# Patient Record
Sex: MALE | Employment: OTHER | URBAN - METROPOLITAN AREA
[De-identification: names, ages, dates, MRNs, and addresses within clinical notes are randomized per-mention and may not be internally consistent; named-entity substitution may affect disease eponyms.]

---

## 2017-06-07 ENCOUNTER — APPOINTMENT (OUTPATIENT)
Dept: RADIOLOGY | Facility: MEDICAL CENTER | Age: 72
DRG: 066 | End: 2017-06-07
Attending: EMERGENCY MEDICINE
Payer: OTHER MISCELLANEOUS

## 2017-06-07 ENCOUNTER — HOSPITAL ENCOUNTER (INPATIENT)
Facility: MEDICAL CENTER | Age: 72
LOS: 1 days | DRG: 066 | End: 2017-06-08
Attending: EMERGENCY MEDICINE | Admitting: INTERNAL MEDICINE
Payer: OTHER MISCELLANEOUS

## 2017-06-07 ENCOUNTER — OFFICE VISIT (OUTPATIENT)
Dept: URGENT CARE | Facility: PHYSICIAN GROUP | Age: 72
End: 2017-06-07
Payer: OTHER MISCELLANEOUS

## 2017-06-07 ENCOUNTER — RESOLUTE PROFESSIONAL BILLING HOSPITAL PROF FEE (OUTPATIENT)
Dept: HOSPITALIST | Facility: MEDICAL CENTER | Age: 72
End: 2017-06-07
Payer: OTHER MISCELLANEOUS

## 2017-06-07 VITALS
SYSTOLIC BLOOD PRESSURE: 184 MMHG | OXYGEN SATURATION: 96 % | RESPIRATION RATE: 16 BRPM | BODY MASS INDEX: 42.49 KG/M2 | DIASTOLIC BLOOD PRESSURE: 84 MMHG | HEIGHT: 65 IN | HEART RATE: 66 BPM | WEIGHT: 255 LBS | TEMPERATURE: 98.2 F

## 2017-06-07 DIAGNOSIS — R20.2 PARESTHESIA: ICD-10-CM

## 2017-06-07 DIAGNOSIS — R47.81 SLURRED SPEECH: ICD-10-CM

## 2017-06-07 DIAGNOSIS — I65.22 STENOSIS OF LEFT CAROTID ARTERY: ICD-10-CM

## 2017-06-07 DIAGNOSIS — I63.9 CEREBROVASCULAR ACCIDENT (CVA), UNSPECIFIED MECHANISM (HCC): ICD-10-CM

## 2017-06-07 DIAGNOSIS — M79.601 RIGHT ARM PAIN: ICD-10-CM

## 2017-06-07 DIAGNOSIS — R27.0 ATAXIA: ICD-10-CM

## 2017-06-07 LAB
ALBUMIN SERPL BCP-MCNC: 4.2 G/DL (ref 3.2–4.9)
ALBUMIN/GLOB SERPL: 1.4 G/DL
ALP SERPL-CCNC: 68 U/L (ref 30–99)
ALT SERPL-CCNC: 17 U/L (ref 2–50)
ANION GAP SERPL CALC-SCNC: 8 MMOL/L (ref 0–11.9)
APTT PPP: 27.5 SEC (ref 24.7–36)
AST SERPL-CCNC: 17 U/L (ref 12–45)
BASOPHILS # BLD AUTO: 0.4 % (ref 0–1.8)
BASOPHILS # BLD: 0.05 K/UL (ref 0–0.12)
BILIRUB SERPL-MCNC: 0.4 MG/DL (ref 0.1–1.5)
BUN SERPL-MCNC: 21 MG/DL (ref 8–22)
CALCIUM SERPL-MCNC: 9.3 MG/DL (ref 8.5–10.5)
CHLORIDE SERPL-SCNC: 102 MMOL/L (ref 96–112)
CO2 SERPL-SCNC: 27 MMOL/L (ref 20–33)
CREAT SERPL-MCNC: 0.87 MG/DL (ref 0.5–1.4)
EOSINOPHIL # BLD AUTO: 0.28 K/UL (ref 0–0.51)
EOSINOPHIL NFR BLD: 2.5 % (ref 0–6.9)
ERYTHROCYTE [DISTWIDTH] IN BLOOD BY AUTOMATED COUNT: 49.9 FL (ref 35.9–50)
GFR SERPL CREATININE-BSD FRML MDRD: >60 ML/MIN/1.73 M 2
GLOBULIN SER CALC-MCNC: 3.1 G/DL (ref 1.9–3.5)
GLUCOSE SERPL-MCNC: 118 MG/DL (ref 65–99)
HCT VFR BLD AUTO: 45.8 % (ref 42–52)
HGB BLD-MCNC: 14.7 G/DL (ref 14–18)
IMM GRANULOCYTES # BLD AUTO: 0.04 K/UL (ref 0–0.11)
IMM GRANULOCYTES NFR BLD AUTO: 0.4 % (ref 0–0.9)
INR PPP: 0.95 (ref 0.87–1.13)
LYMPHOCYTES # BLD AUTO: 4.09 K/UL (ref 1–4.8)
LYMPHOCYTES NFR BLD: 36.4 % (ref 22–41)
MAGNESIUM SERPL-MCNC: 2.1 MG/DL (ref 1.5–2.5)
MCH RBC QN AUTO: 29.3 PG (ref 27–33)
MCHC RBC AUTO-ENTMCNC: 32.1 G/DL (ref 33.7–35.3)
MCV RBC AUTO: 91.4 FL (ref 81.4–97.8)
MONOCYTES # BLD AUTO: 1 K/UL (ref 0–0.85)
MONOCYTES NFR BLD AUTO: 8.9 % (ref 0–13.4)
NEUTROPHILS # BLD AUTO: 5.79 K/UL (ref 1.82–7.42)
NEUTROPHILS NFR BLD: 51.4 % (ref 44–72)
NRBC # BLD AUTO: 0 K/UL
NRBC BLD AUTO-RTO: 0 /100 WBC
PLATELET # BLD AUTO: 235 K/UL (ref 164–446)
PMV BLD AUTO: 9.8 FL (ref 9–12.9)
POTASSIUM SERPL-SCNC: 4.2 MMOL/L (ref 3.6–5.5)
PROT SERPL-MCNC: 7.3 G/DL (ref 6–8.2)
PROTHROMBIN TIME: 13 SEC (ref 12–14.6)
RBC # BLD AUTO: 5.01 M/UL (ref 4.7–6.1)
SODIUM SERPL-SCNC: 137 MMOL/L (ref 135–145)
TROPONIN I SERPL-MCNC: <0.01 NG/ML (ref 0–0.04)
TSH SERPL DL<=0.005 MIU/L-ACNC: 4.43 UIU/ML (ref 0.3–3.7)
WBC # BLD AUTO: 11.3 K/UL (ref 4.8–10.8)

## 2017-06-07 PROCEDURE — 93005 ELECTROCARDIOGRAM TRACING: CPT | Performed by: EMERGENCY MEDICINE

## 2017-06-07 PROCEDURE — 70496 CT ANGIOGRAPHY HEAD: CPT

## 2017-06-07 PROCEDURE — 84484 ASSAY OF TROPONIN QUANT: CPT

## 2017-06-07 PROCEDURE — A9270 NON-COVERED ITEM OR SERVICE: HCPCS | Performed by: EMERGENCY MEDICINE

## 2017-06-07 PROCEDURE — 99285 EMERGENCY DEPT VISIT HI MDM: CPT

## 2017-06-07 PROCEDURE — 99203 OFFICE O/P NEW LOW 30 MIN: CPT | Performed by: PHYSICIAN ASSISTANT

## 2017-06-07 PROCEDURE — 85730 THROMBOPLASTIN TIME PARTIAL: CPT

## 2017-06-07 PROCEDURE — 85025 COMPLETE CBC W/AUTO DIFF WBC: CPT

## 2017-06-07 PROCEDURE — 700117 HCHG RX CONTRAST REV CODE 255: Performed by: EMERGENCY MEDICINE

## 2017-06-07 PROCEDURE — 770006 HCHG ROOM/CARE - MED/SURG/GYN SEMI*

## 2017-06-07 PROCEDURE — 84443 ASSAY THYROID STIM HORMONE: CPT

## 2017-06-07 PROCEDURE — 99222 1ST HOSP IP/OBS MODERATE 55: CPT | Performed by: INTERNAL MEDICINE

## 2017-06-07 PROCEDURE — 85610 PROTHROMBIN TIME: CPT

## 2017-06-07 PROCEDURE — 80053 COMPREHEN METABOLIC PANEL: CPT

## 2017-06-07 PROCEDURE — 36415 COLL VENOUS BLD VENIPUNCTURE: CPT

## 2017-06-07 PROCEDURE — 70498 CT ANGIOGRAPHY NECK: CPT

## 2017-06-07 PROCEDURE — 700105 HCHG RX REV CODE 258: Performed by: EMERGENCY MEDICINE

## 2017-06-07 PROCEDURE — 700102 HCHG RX REV CODE 250 W/ 637 OVERRIDE(OP): Performed by: EMERGENCY MEDICINE

## 2017-06-07 PROCEDURE — 83735 ASSAY OF MAGNESIUM: CPT

## 2017-06-07 PROCEDURE — 83036 HEMOGLOBIN GLYCOSYLATED A1C: CPT

## 2017-06-07 RX ORDER — ONDANSETRON 4 MG/1
4 TABLET, ORALLY DISINTEGRATING ORAL EVERY 4 HOURS PRN
Status: DISCONTINUED | OUTPATIENT
Start: 2017-06-07 | End: 2017-06-08 | Stop reason: HOSPADM

## 2017-06-07 RX ORDER — INSULIN GLARGINE 100 [IU]/ML
24 INJECTION, SOLUTION SUBCUTANEOUS 2 TIMES DAILY
COMMUNITY

## 2017-06-07 RX ORDER — AMOXICILLIN 250 MG
2 CAPSULE ORAL 2 TIMES DAILY
Status: DISCONTINUED | OUTPATIENT
Start: 2017-06-07 | End: 2017-06-08 | Stop reason: HOSPADM

## 2017-06-07 RX ORDER — SODIUM CHLORIDE 9 MG/ML
INJECTION, SOLUTION INTRAVENOUS CONTINUOUS
Status: DISCONTINUED | OUTPATIENT
Start: 2017-06-07 | End: 2017-06-08 | Stop reason: HOSPADM

## 2017-06-07 RX ORDER — METOPROLOL SUCCINATE 50 MG/1
50 TABLET, EXTENDED RELEASE ORAL DAILY
COMMUNITY
End: 2017-06-07

## 2017-06-07 RX ORDER — SODIUM CHLORIDE 9 MG/ML
1000 INJECTION, SOLUTION INTRAVENOUS ONCE
Status: COMPLETED | OUTPATIENT
Start: 2017-06-07 | End: 2017-06-07

## 2017-06-07 RX ORDER — ASPIRIN 300 MG/1
600 SUPPOSITORY RECTAL ONCE
Status: COMPLETED | OUTPATIENT
Start: 2017-06-07 | End: 2017-06-07

## 2017-06-07 RX ORDER — ASPIRIN 81 MG/1
81 TABLET, CHEWABLE ORAL DAILY
Status: DISCONTINUED | OUTPATIENT
Start: 2017-06-08 | End: 2017-06-08 | Stop reason: HOSPADM

## 2017-06-07 RX ORDER — BISACODYL 10 MG
10 SUPPOSITORY, RECTAL RECTAL
Status: DISCONTINUED | OUTPATIENT
Start: 2017-06-07 | End: 2017-06-08 | Stop reason: HOSPADM

## 2017-06-07 RX ORDER — POLYETHYLENE GLYCOL 3350 17 G/17G
1 POWDER, FOR SOLUTION ORAL
Status: DISCONTINUED | OUTPATIENT
Start: 2017-06-07 | End: 2017-06-08 | Stop reason: HOSPADM

## 2017-06-07 RX ORDER — CLONAZEPAM 1 MG/1
0.5 TABLET ORAL 2 TIMES DAILY
COMMUNITY

## 2017-06-07 RX ORDER — ONDANSETRON 2 MG/ML
4 INJECTION INTRAMUSCULAR; INTRAVENOUS EVERY 4 HOURS PRN
Status: DISCONTINUED | OUTPATIENT
Start: 2017-06-07 | End: 2017-06-08 | Stop reason: HOSPADM

## 2017-06-07 RX ORDER — THYROID 90 MG/1
90 TABLET ORAL DAILY
COMMUNITY

## 2017-06-07 RX ORDER — ATORVASTATIN CALCIUM 80 MG/1
80 TABLET, FILM COATED ORAL EVERY EVENING
Status: DISCONTINUED | OUTPATIENT
Start: 2017-06-07 | End: 2017-06-08 | Stop reason: HOSPADM

## 2017-06-07 RX ORDER — DEXTROSE MONOHYDRATE 25 G/50ML
25 INJECTION, SOLUTION INTRAVENOUS
Status: DISCONTINUED | OUTPATIENT
Start: 2017-06-07 | End: 2017-06-08 | Stop reason: HOSPADM

## 2017-06-07 RX ADMIN — SODIUM CHLORIDE 1000 ML: 9 INJECTION, SOLUTION INTRAVENOUS at 19:48

## 2017-06-07 RX ADMIN — IOHEXOL 100 ML: 350 INJECTION, SOLUTION INTRAVENOUS at 20:45

## 2017-06-07 RX ADMIN — ASPIRIN 600 MG: 300 SUPPOSITORY RECTAL at 22:07

## 2017-06-07 ASSESSMENT — ENCOUNTER SYMPTOMS
TREMORS: 0
SENSORY CHANGE: 1
DIZZINESS: 1
NAUSEA: 0
CHILLS: 0
VOMITING: 0
LOSS OF CONSCIOUSNESS: 0
FEVER: 0
PALPITATIONS: 0
SPEECH CHANGE: 1
FOCAL WEAKNESS: 1
SEIZURES: 0
TINGLING: 1

## 2017-06-07 NOTE — IP AVS SNAPSHOT
6/8/2017    Den Mayes  [address] [facility] [address]  [place]    Dear Den:    [facility] wants to ensure your discharge home is safe and you or your loved ones have had all of your questions answered regarding your care after you leave the hospital.    Below is a list of resources and contact information should you have any questions regarding your hospital stay, follow-up instructions, or active medical symptoms.    Questions or Concerns Regarding… Contact   Medical Questions Related to Your Discharge  (7 days a week, 8am-5pm) Contact a Nurse Care Coordinator   [phone]   Medical Questions Not Related to Your Discharge  (24 hours a day / 7 days a week)  Contact the Nurse Health Line   [phone]    Medications or Discharge Instructions Refer to your discharge packet   or contact your [facility] Primary Care Provider   [phone]   Follow-up Appointment(s) Schedule your appointment via [facility]   or contact Scheduling [phone]   Billing Review your statement via Atosho  or contact Billing [phone]   Medical Records Review your records via Atosho   or contact Medical Records [phone]     You may receive a telephone call within two days of discharge. This call is to make certain you understand your discharge instructions and have the opportunity to have any questions answered. You can also easily access your medical information, test results and upcoming appointments via the Atosho free online health management tool. You can learn more and sign up at [employer]/Atosho. For assistance setting up your Atosho account, please call [phone].    Once again, we want to ensure your discharge home is safe and that you have a clear understanding of any next steps in your care. If you have any questions or concerns, please do not hesitate to contact us, we are here for you. Thank you for choosing [facility] for your healthcare needs.    Sincerely,    Your [facility] Healthcare Team

## 2017-06-07 NOTE — MR AVS SNAPSHOT
"        Den Hernandezblay   2017 5:10 PM   Office Visit   MRN: [id number]    Department:  [facility]   Dept Phone:  [phone]    Description:  Male : 1945   Provider:  Michael Rivers PA-C           Reason for Visit     Arm Pain           Allergies as of 2017     No Known Allergies      You were diagnosed with     Slurred speech   [2015]       Ataxia   [460131]       Right arm pain   [271271]       Paresthesia   [2015]         Vital Signs     Blood Pressure Pulse Temperature Respirations Height Weight    184/84 mmHg 66 36.8 °C (98.2 °F) 16 1.651 m (5' 5\") 115.667 kg (255 lb)    Body Mass Index Oxygen Saturation                42.43 kg/m2 96%          Basic Information     Date Of Birth Sex Race Ethnicity Preferred Language    1945 Male Unable to Obtain Unknown English      Health Maintenance     Patient has no pending health maintenance at this time      Current Immunizations     No immunizations on file.      Below and/or attached are the medications your provider expects you to take. Review all of your home medications and newly ordered medications with your provider and/or pharmacist. Follow medication instructions as directed by your provider and/or pharmacist. Please keep your medication list with you and share with your provider. Update the information when medications are discontinued, doses are changed, or new medications (including over-the-counter products) are added; and carry medication information at all times in the event of emergency situations     Allergies:  No Known Allergies          Medications  Valid as of: 2017 -  6:16 PM    Generic Name Brand Name Tablet Size Instructions for use    ClonazePAM (Tab) KLONOPIN 1 MG Take 0.5 mg by mouth 2 times a day.        Insulin Glargine (Solution) LANTUS 100 UNIT/ML Inject  as instructed every evening.        Metoprolol Succinate (TABLET SR 24 HR) TOPROL XL 50 MG Take 50 mg by mouth every day.        Thyroid (Tab) " ARMOUR THYROID 90 MG Take 90 mg by mouth every day.        .                 Medicines prescribed today were sent to:     None      Medication refill instructions:       If your prescription bottle indicates you have medication refills left, it is not necessary to call your provider’s office. Please contact your pharmacy and they will refill your medication.    If your prescription bottle indicates you do not have any refills left, you may request refills at any time through one of the following ways: The online [employer] system (except Urgent Care), by calling your provider’s office, or by asking your pharmacy to contact your provider’s office with a refill request. Medication refills are processed only during regular business hours and may not be available until the next business day. Your provider may request additional information or to have a follow-up visit with you prior to refilling your medication.   *Please Note: Medication refills are assigned a new Rx number when refilled electronically. Your pharmacy may indicate that no refills were authorized even though a new prescription for the same medication is available at the pharmacy. Please request the medicine by name with the pharmacy before contacting your provider for a refill.           [employer] Access Code: [email or web address]  Expires: 7/7/2017  6:16 PM    Your email address is not on file at [email or web address].  Email Addresses are required for you to sign up for [employer], please contact [phone] to verify your personal information and to provide your email address prior to attempting to register for [employer].    Den Mayes  [address]  [place]    [employer]  A secure, online tool to manage your health information     [facility]’s [employer]® is a secure, online tool that connects you to your personalized health information from the privacy of your home -- day or night - making it very easy for you to manage your healthcare. Once the  activation process is completed, you can even access your medical information using the UGE ivory, which is available for free in the Apple Ivory store or Google Play store.     To learn more about UGE, visit [email or web address]    There are two levels of access available (as shown below):   My Chart Features  [facility] Doctor [facility]  Urgent  Care Non-[facility] Primary Care Doctor   Email your healthcare team securely and privately 24/7 X X X    Manage appointments: schedule your next appointment; view details of past/upcoming appointments X      Request prescription refills. X      View recent personal medical records, including lab and immunizations X X X X   View health record, including health history, allergies, medications X X X X   Read reports about your outpatient visits, procedures, consult and ER notes X X X X   See your discharge summary, which is a recap of your hospital and/or ER visit that includes your diagnosis, lab results, and care plan X X  X     How to register for UGE:  Once your e-mail address has been verified, follow the following steps to sign up for UGE.     1. Go to  [email or web address]  2. Click on the Sign Up Now box, which takes you to the New Member Sign Up page. You will need to provide the following information:  a. Enter your UGE Access Code exactly as it appears at the top of this page. (You will not need to use this code after you’ve completed the sign-up process. If you do not sign up before the expiration date, you must request a new code.)   b. Enter your date of birth.   c. Enter your home email address.   d. Click Submit, and follow the next screen’s instructions.  3. Create a BitXt ID. This will be your UGE login ID and cannot be changed, so think of one that is secure and easy to remember.  4. Create a UGE password. You can change your password at any time.  5. Enter your Password Reset Question and Answer.  This can be used at a later time if you forget your password.   6. Enter your e-mail address. This allows you to receive e-mail notifications when new information is available in [employer].  7. Click Sign Up. You can now view your health information.    For assistance activating your [employer] account, call [phone]

## 2017-06-07 NOTE — IP AVS SNAPSHOT
" <p align=\"LEFT\"><IMG SRC=\"//[email or web address]$/[email or web address]\" alt=\"Image\" WIDTH=\"50%\" HEIGHT=\"200\" BORDER=\"\"></p>                   Name:Den Mayes  Medical Record Number:[id number]  CSN: [id number]    YOB: 1945   Age: 72 y.o.  Sex: male  HT:1.702 m (5' 7.01\") WT: 121.3 kg (267 lb 6.7 oz)          Admit Date: 6/7/2017     Discharge Date:   Today's Date: 6/8/2017  Attending Doctor:  Arelis Miles M.D.                  Allergies:  Review of patient's allergies indicates no known allergies.             Medication List      Take these Medications        Instructions    ARMOUR THYROID 90 MG Tabs   Generic drug:  thyroid    Take 90 mg by mouth every day.   Dose:  90 mg       aspirin 81 MG Chew chewable tablet   Commonly known as:  ASA    Take 1 Tab by mouth every day.   Dose:  81 mg       atorvastatin 80 MG tablet   Commonly known as:  LIPITOR    Take 1 Tab by mouth every evening.   Dose:  80 mg       clonazepam 1 MG Tabs   Commonly known as:  KLONOPIN    Take 0.5 mg by mouth 2 times a day.   Dose:  0.5 mg       insulin glargine 100 UNIT/ML Soln   Commonly known as:  LANTUS    Inject 24 Units as instructed 2 times a day.   Dose:  24 Units         "

## 2017-06-07 NOTE — IP AVS SNAPSHOT
" Home Care Instructions                                                                                                                  Name:Den Mayes  Medical Record Number:[id number]  CSN: [id number]    YOB: 1945   Age: 72 y.o.  Sex: male  HT:1.702 m (5' 7.01\") WT: 121.3 kg (267 lb 6.7 oz)          Admit Date: 6/7/2017     Discharge Date:   Today's Date: 6/8/2017  Attending Doctor:  Arelis Miles M.D.                  Allergies:  Review of patient's allergies indicates no known allergies.            Discharge Instructions       Discharge Instructions    Discharged to home by car with relative. Discharged via wheelchair, hospital escort: Yes.  Special equipment needed: Not Applicable    Be sure to schedule a follow-up appointment with your primary care doctor or any specialists as instructed.     Discharge Plan:   Influenza Vaccine Indication: Indicated: Not available from distributor/    I understand that a diet low in cholesterol, fat, and sodium is recommended for good health. Unless I have been given specific instructions below for another diet, I accept this instruction as my diet prescription.   Other diet: Cardiac    Special Instructions: None    · Is patient discharged on Warfarin / Coumadin?   No     · Is patient Post Blood Transfusion?  No    Depression / Suicide Risk    As you are discharged from this [facility] facility, it is important to learn how to keep safe from harming yourself.    Recognize the warning signs:  · Abrupt changes in personality, positive or negative- including increase in energy   · Giving away possessions  · Change in eating patterns- significant weight changes-  positive or negative  · Change in sleeping patterns- unable to sleep or sleeping all the time   · Unwillingness or inability to communicate  · Depression  · Unusual sadness, discouragement and loneliness  · Talk of wanting to die  · Neglect of personal appearance   · Rebelliousness- reckless " behavior  · Withdrawal from people/activities they love  · Confusion- inability to concentrate     If you or a loved one observes any of these behaviors or has concerns about self-harm, here's what you can do:  · Talk about it- your feelings and reasons for harming yourself  · Remove any means that you might use to hurt yourself (examples: pills, rope, extension cords, firearm)  · Get professional help from the community (Mental Health, Substance Abuse, psychological counseling)  · Do not be alone:Call your Safe Contact- someone whom you trust who will be there for you.  · Call your local CRISIS HOTLINE 734-0387 or 671-235-7891  · Call your local Children's Mobile Crisis Response Team [place] (350) 150-2301 or www.MyLabYogi.com  · Call the toll free National Suicide Prevention Hotlines   · National Suicide Prevention Lifeline 309-599-PDVB (5921)  · [place] Line Network 800-SUICIDE (490-9717)    Stroke Prevention  Some health problems and behaviors may make it more likely for you to have a stroke. Below are ways to lessen your risk of having a stroke.   · Be active for at least 30 minutes on most or all days.  · Do not smoke. Try not to be around others who smoke.  · Do not drink too much alcohol.  ¨ Do not have more than 2 drinks a day if you are a man.  ¨ Do not have more than 1 drink a day if you are a woman and are not pregnant.  · Eat healthy foods, such as fruits and vegetables. If you were put on a specific diet, follow the diet as told.  · Keep your cholesterol levels under control through diet and medicines. Look for foods that are low in saturated fat, trans fat, cholesterol, and are high in fiber.  · If you have diabetes, follow all diet plans and take your medicine as told.  · Ask your doctor if you need treatment to lower your blood pressure. If you have high blood pressure (hypertension), follow all diet plans and take your medicine as told by your doctor.  · If you are 18-39 years old,  "have your blood pressure checked every 3-5 years. If you are age 40 or older, have your blood pressure checked every year.  · Keep a healthy weight. Eat foods that are low in calories, salt, saturated fat, trans fat, and cholesterol.  · Do not take drugs.  · Avoid birth control pills, if this applies. Talk to your doctor about the risks of taking birth control pills.  · Talk to your doctor if you have sleep problems (sleep apnea).  · Take all medicine as told by your doctor.  ¨ You may be told to take aspirin or blood thinner medicine. Take this medicine as told by your doctor.  ¨ Understand your medicine instructions.  · Make sure any other conditions you have are being taken care of.  GET HELP RIGHT AWAY IF:  · You suddenly lose feeling (you feel numb) or have weakness in your face, arm, or leg.  · Your face or eyelid hangs down to one side.  · You suddenly feel confused.  · You have trouble talking (aphasia) or understanding what people are saying.  · You suddenly have trouble seeing in one or both eyes.  · You suddenly have trouble walking.  · You are dizzy.  · You lose your balance or your movements are clumsy (uncoordinated).  · You suddenly have a very bad headache and you do not know the cause.  · You have new chest pain.  · Your heart feels like it is fluttering or skipping a beat (irregular heartbeat).  Do not wait to see if the symptoms above go away. Get help right away. Call your local emergency services (911 in [place] Do not drive yourself to the hospital.     This information is not intended to replace advice given to you by your health care provider. Make sure you discuss any questions you have with your health care provider.     Document Released: 06/18/2013 Document Revised: 01/08/2016 Document Reviewed: 06/20/2014  [employer] Interactive Patient Education ©2016 Elsevier Inc.  STROKE POCKET CARD    Stroke Recommendations    Action Details      Neuro checks  Per physician order set or \"Assessment " "Frequency Guidelines\".     DVT/VTE prophylaxis by 2nd day Stroke pts are at increased risk of developing a DVT. Consider both pharmacological (Lovenox & Heparin SQ) and mechanical (SCD's).   Anticoagulation therapy for Pts with A-fib/flutter Atrial fibrillation/flutter is the most common form of cardiac arrhythmia. Blood pools in the atria of the heart, which can result in the formation of clots. Anticoagulation therapy can prevent initial stroke and prevent recurrent ischemic stroke. Pt's with a-fib/flutter should be discharged on anticoagulation unless contraindicated.    Antithrombotic medication by end of 2nd day ASA, Plavix, Aggrenox, Coumadin. Antithrombotic medication should be given by the end of the second day unless contraindicated. If NPO, consider alternate routes.   Discharge on Statin Medication  Obtain a lipid profile within 48 hours of admission. Patients with high cholesterol (LDL >100), without a current lipid panel, or who were on lipid lowering meds prior to admit, may need a discharge Rx for a statin medication. If contraindicated, MD must document reason.      Dysphagia screen  Aspiration is a concern for pts with neurological deficits. Swallow screen or swallowing eval by Speech Therapy to assess for dysphagia must be performed prior to any oral intake, including meds.    Discharged on antithrombotic Reduce stroke mortality and morbidity; prevent recurrence.  Consider ASA, Plavix, Coumadin or Aggrenox.        Action  Details   Smoking cessation Cigarettes and other tobacco products can cause damage to blood vessels and increase the risk of stroke. (Provide Smoking Cessation counseling. Emphasize quitting if smoked in last 12 months).   Rehab assessment All patients suspected of having a stroke should be assessed for rehabilitation needs. This includes PT, OT, SLP referrals/interventions. If the symptoms resolve, MD needs to document that no rehab was needed.   Stroke Education should be given " "to patient or caregivers use-Stroke Education Guide. Document in EPIC using the Stroke/CVA/TIA/  Hemorrahagic Ischemia education template. In addition, use the discharge navigator with the appropriate stroke diagnosis. 1 Warning signs & symptoms of stroke: Sudden numbness or weakness of face, arm, leg; sudden confusion, trouble speaking or understanding; sudden trouble seeing in one or both eyes; sudden trouble walking, dizziness, loss of balance or coordination; sudden severe headache with no known cause.    2 Activation of emergency medical system.    3 Patient medications prescribed at discharge. The discharge summary must match the patients' written discharge instructions.    4 Patient risk factors for stroke: HTN, smoking, high cholesterol, diabetes, obesity, poor nutrition, atrial fibrillation, carotid stenosis, illicit drug use, etc.    5 Follow up with physician post discharge.        Remember the acronym \"F.A.S.T.\"   F=Face (Ask person to smile-Does one side of the face droop?)  A=Arm (Ask person to raise both arms-Does one arm drift down?)   S=Speech (Ask person to repeat a phrase-Is their speech slurred?)  T=Time (If you observe any of these signs, get help Immediately!)                Discharge Medication Instructions:    Below are the medications your physician expects you to take upon discharge:    Review all your home medications and newly ordered medications with your doctor and/or pharmacist. Follow medication instructions as directed by your doctor and/or pharmacist.    Please keep your medication list with you and share with your physician.               Medication List      START taking these medications        Instructions    Morning Afternoon Evening Bedtime    aspirin 81 MG Chew chewable tablet   Last time this was given:  81 mg on 6/8/2017  8:08 AM   Commonly known as:  ASA        Take 1 Tab by mouth every day.   Dose:  81 mg                        atorvastatin 80 MG tablet   Commonly known " as:  LIPITOR        Take 1 Tab by mouth every evening.   Dose:  80 mg                          CONTINUE taking these medications        Instructions    Morning Afternoon Evening Bedtime    ARMOUR THYROID 90 MG Tabs   Generic drug:  thyroid        Take 90 mg by mouth every day.   Dose:  90 mg                        clonazepam 1 MG Tabs   Commonly known as:  KLONOPIN        Take 0.5 mg by mouth 2 times a day.   Dose:  0.5 mg                        insulin glargine 100 UNIT/ML Soln   Commonly known as:  LANTUS        Inject 24 Units as instructed 2 times a day.   Dose:  24 Units                             Where to Get Your Medications      Information about where to get these medications is not yet available     ! Ask your nurse or doctor about these medications    - aspirin 81 MG Chew chewable tablet  - atorvastatin 80 MG tablet            Instructions           Diet / Nutrition:    Follow any diet instructions given to you by your doctor or the dietician, including how much salt (sodium) you are allowed each day.    If you are overweight, talk to your doctor about a weight reduction plan.    Activity:    Remain physically active following your doctor's instructions about exercise and activity.    Rest often.     Any time you become even a little tired or short of breath, SIT DOWN and rest.    Worsening Symptoms:    Report any of the following signs and symptoms to the doctor's office immediately:    *Pain of jaw, arm, or neck  *Chest pain not relieved by medication                               *Dizziness or loss of consciousness  *Difficulty breathing even when at rest   *More tired than usual                                       *Bleeding drainage or swelling of surgical site  *Swelling of feet, ankles, legs or stomach                 *Fever (>100ºF)  *Pink or blood tinged sputum  *Weight gain (3lbs/day or 5lbs /week)           *Shock from internal defibrillator (if applicable)  *Palpitations or irregular  heartbeats                *Cool and/or numb extremities    Stroke Awareness    Common Risk Factors for Stroke include:    Age  Atrial Fibrillation  Carotid Artery Stenosis  Diabetes Mellitus  Excessive alcohol consumption  High blood pressure  Overweight   Physical inactivity  Smoking    Warning signs and symptoms of a stroke include:    *Sudden numbness or weakness of the face, arm or leg (especially on one side of the body).  *Sudden confusion, trouble speaking or understanding.  *Sudden trouble seeing in one or both eyes.  *Sudden trouble walking, dizziness, loss of balance or coordination.Sudden severe headache with no known cause.    It is very important to get treatment quickly when a stroke occurs. If you experience any of the above warning signs, call 911 immediately.                   Disclaimer         Quit Smoking / Tobacco Use:    I understand the use of any tobacco products increases my chance of suffering from future heart disease or stroke and could cause other illnesses which may shorten my life. Quitting the use of tobacco products is the single most important thing I can do to improve my health. For further information on smoking / tobacco cessation call a Toll Free Quit Line at 1-528.279.7178 (*National Cancer [place]) or 1-327.491.4755 (American Lung Association) or you can access the web based program at www.lungusa.org.    [place] Tobacco Users Help Line:  (627) 664-7990       Toll Free: 1-289.342.7819  Quit Tobacco Program [facility] (089)560-3792    Crisis Hotline:    [place] Crisis Hotline:  8-258-XQUJHIK or 1-776.573.3102    [place] Crisis Hotline:    1-579.849.1087 or 807-158-2620    Discharge Survey:   Thank you for choosing [facility]. We hope we did everything we could to make your hospital stay a pleasant one. You may be receiving a phone survey and we would appreciate your time and participation in answering the questions. Your input is very valuable to us  in our efforts to improve our service to our patients and their families.        My signature on this form indicates that:    1. I have reviewed and understand the above information.  2. My questions regarding this information have been answered to my satisfaction.  3. I have formulated a plan with my discharge nurse to obtain my prescribed medications for home.                  Disclaimer         __________________________________                     __________       ________                       Patient Signature                                                 Date                    Time

## 2017-06-07 NOTE — IP AVS SNAPSHOT
cdream network Access Code: [email or web address]  Expires: 7/7/2017  6:16 PM    Your email address is not on file at [facility].  Email Addresses are required for you to sign up for cdream network, please contact [phone] to verify your personal information and to provide your email address prior to attempting to register for cdream network.    Den Mayes  [address]    cdream network  A secure, online tool to manage your health information     [facility]’s cdream network® is a secure, online tool that connects you to your personalized health information from the privacy of your home -- day or night - making it very easy for you to manage your healthcare. Once the activation process is completed, you can even access your medical information using the cdream network ivory, which is available for free in the Apple Ivory store or Google Play store.     To learn more about cdream network, visit [email or web address]    There are two levels of access available (as shown below):   My Chart Features  [facility] Primary Care Doctor [facility]  Specialists [facility]  Urgent  Care Non-[facility] Primary Care Doctor   Email your healthcare team securely and privately 24/7 X X X    Manage appointments: schedule your next appointment; view details of past/upcoming appointments X      Request prescription refills. X      View recent personal medical records, including lab and immunizations X X X X   View health record, including health history, allergies, medications X X X X   Read reports about your outpatient visits, procedures, consult and ER notes X X X X   See your discharge summary, which is a recap of your hospital and/or ER visit that includes your diagnosis, lab results, and care plan X X  X     How to register for cdream network:  Once your e-mail address has been verified, follow the following steps to sign up for cdream network.     1. Go to  [email or web address]  2. Click on the Sign Up Now box, which takes you to the New Member Sign Up page. You  will need to provide the following information:  a. Enter your [employer] Access Code exactly as it appears at the top of this page. (You will not need to use this code after you’ve completed the sign-up process. If you do not sign up before the expiration date, you must request a new code.)   b. Enter your date of birth.   c. Enter your home email address.   d. Click Submit, and follow the next screen’s instructions.  3. Create a GlassPoint Solart ID. This will be your [employer] login ID and cannot be changed, so think of one that is secure and easy to remember.  4. Create a [employer] password. You can change your password at any time.  5. Enter your Password Reset Question and Answer. This can be used at a later time if you forget your password.   6. Enter your e-mail address. This allows you to receive e-mail notifications when new information is available in [employer] First Choice.  7. Click Sign Up. You can now view your health information.    For assistance activating your [employer] account, call [phone]

## 2017-06-08 ENCOUNTER — APPOINTMENT (OUTPATIENT)
Dept: RADIOLOGY | Facility: MEDICAL CENTER | Age: 72
DRG: 066 | End: 2017-06-08
Attending: INTERNAL MEDICINE
Payer: OTHER MISCELLANEOUS

## 2017-06-08 VITALS
OXYGEN SATURATION: 97 % | BODY MASS INDEX: 41.97 KG/M2 | HEIGHT: 67 IN | RESPIRATION RATE: 12 BRPM | WEIGHT: 267.42 LBS | SYSTOLIC BLOOD PRESSURE: 160 MMHG | TEMPERATURE: 97.7 F | HEART RATE: 70 BPM | DIASTOLIC BLOOD PRESSURE: 62 MMHG

## 2017-06-08 LAB
ANION GAP SERPL CALC-SCNC: 6 MMOL/L (ref 0–11.9)
BUN SERPL-MCNC: 15 MG/DL (ref 8–22)
CALCIUM SERPL-MCNC: 8.8 MG/DL (ref 8.5–10.5)
CHLORIDE SERPL-SCNC: 106 MMOL/L (ref 96–112)
CHOLEST SERPL-MCNC: 209 MG/DL (ref 100–199)
CO2 SERPL-SCNC: 24 MMOL/L (ref 20–33)
CREAT SERPL-MCNC: 0.73 MG/DL (ref 0.5–1.4)
ERYTHROCYTE [DISTWIDTH] IN BLOOD BY AUTOMATED COUNT: 50.4 FL (ref 35.9–50)
EST. AVERAGE GLUCOSE BLD GHB EST-MCNC: 151 MG/DL
GFR SERPL CREATININE-BSD FRML MDRD: >60 ML/MIN/1.73 M 2
GLUCOSE BLD-MCNC: 123 MG/DL (ref 65–99)
GLUCOSE BLD-MCNC: 140 MG/DL (ref 65–99)
GLUCOSE BLD-MCNC: 96 MG/DL (ref 65–99)
GLUCOSE SERPL-MCNC: 102 MG/DL (ref 65–99)
HBA1C MFR BLD: 6.9 % (ref 0–5.6)
HCT VFR BLD AUTO: 43.6 % (ref 42–52)
HDLC SERPL-MCNC: 37 MG/DL
HGB BLD-MCNC: 14.1 G/DL (ref 14–18)
LDLC SERPL CALC-MCNC: 132 MG/DL
LV EJECT FRACT  99904: 60
LV EJECT FRACT MOD 2C 99903: 65.11
LV EJECT FRACT MOD 4C 99902: 59.65
LV EJECT FRACT MOD BP 99901: 62.68
MCH RBC QN AUTO: 29.4 PG (ref 27–33)
MCHC RBC AUTO-ENTMCNC: 32.3 G/DL (ref 33.7–35.3)
MCV RBC AUTO: 91 FL (ref 81.4–97.8)
PLATELET # BLD AUTO: 207 K/UL (ref 164–446)
PMV BLD AUTO: 9.8 FL (ref 9–12.9)
POTASSIUM SERPL-SCNC: 3.9 MMOL/L (ref 3.6–5.5)
RBC # BLD AUTO: 4.79 M/UL (ref 4.7–6.1)
SODIUM SERPL-SCNC: 136 MMOL/L (ref 135–145)
TRIGL SERPL-MCNC: 201 MG/DL (ref 0–149)
WBC # BLD AUTO: 8.9 K/UL (ref 4.8–10.8)

## 2017-06-08 PROCEDURE — 82962 GLUCOSE BLOOD TEST: CPT | Mod: 91

## 2017-06-08 PROCEDURE — 97162 PT EVAL MOD COMPLEX 30 MIN: CPT

## 2017-06-08 PROCEDURE — 70551 MRI BRAIN STEM W/O DYE: CPT

## 2017-06-08 PROCEDURE — 80061 LIPID PANEL: CPT

## 2017-06-08 PROCEDURE — A9270 NON-COVERED ITEM OR SERVICE: HCPCS | Performed by: INTERNAL MEDICINE

## 2017-06-08 PROCEDURE — 700102 HCHG RX REV CODE 250 W/ 637 OVERRIDE(OP): Performed by: INTERNAL MEDICINE

## 2017-06-08 PROCEDURE — 97165 OT EVAL LOW COMPLEX 30 MIN: CPT

## 2017-06-08 PROCEDURE — 700105 HCHG RX REV CODE 258: Performed by: INTERNAL MEDICINE

## 2017-06-08 PROCEDURE — 93306 TTE W/DOPPLER COMPLETE: CPT | Mod: 26 | Performed by: INTERNAL MEDICINE

## 2017-06-08 PROCEDURE — G8987 SELF CARE CURRENT STATUS: HCPCS | Mod: CH

## 2017-06-08 PROCEDURE — 80048 BASIC METABOLIC PNL TOTAL CA: CPT

## 2017-06-08 PROCEDURE — G8988 SELF CARE GOAL STATUS: HCPCS | Mod: CH

## 2017-06-08 PROCEDURE — 85027 COMPLETE CBC AUTOMATED: CPT

## 2017-06-08 PROCEDURE — G8989 SELF CARE D/C STATUS: HCPCS | Mod: CH

## 2017-06-08 PROCEDURE — G8978 MOBILITY CURRENT STATUS: HCPCS | Mod: CI

## 2017-06-08 PROCEDURE — 93306 TTE W/DOPPLER COMPLETE: CPT

## 2017-06-08 PROCEDURE — 99239 HOSP IP/OBS DSCHRG MGMT >30: CPT | Performed by: INTERNAL MEDICINE

## 2017-06-08 PROCEDURE — G8979 MOBILITY GOAL STATUS: HCPCS | Mod: CI

## 2017-06-08 RX ORDER — ATORVASTATIN CALCIUM 80 MG/1
80 TABLET, FILM COATED ORAL EVERY EVENING
Qty: 30 TAB | Refills: 1 | Status: SHIPPED | OUTPATIENT
Start: 2017-06-08

## 2017-06-08 RX ORDER — ASPIRIN 81 MG/1
81 TABLET, CHEWABLE ORAL DAILY
Qty: 100 TAB | Refills: 0 | Status: SHIPPED | OUTPATIENT
Start: 2017-06-08

## 2017-06-08 RX ADMIN — ASPIRIN 81 MG: 81 TABLET, CHEWABLE ORAL at 08:08

## 2017-06-08 RX ADMIN — SODIUM CHLORIDE: 9 INJECTION, SOLUTION INTRAVENOUS at 00:26

## 2017-06-08 RX ADMIN — DOCUSATE SODIUM AND SENNOSIDES 2 TABLET: 8.6; 5 TABLET, FILM COATED ORAL at 08:08

## 2017-06-08 ASSESSMENT — LIFESTYLE VARIABLES
EVER HAD A DRINK FIRST THING IN THE MORNING TO STEADY YOUR NERVES TO GET RID OF A HANGOVER: NO
ON A TYPICAL DAY WHEN YOU DRINK ALCOHOL HOW MANY DRINKS DO YOU HAVE: 2
EVER_SMOKED: YES
HAVE YOU EVER FELT YOU SHOULD CUT DOWN ON YOUR DRINKING: NO
TOTAL SCORE: 0
EVER FELT BAD OR GUILTY ABOUT YOUR DRINKING: NO
ALCOHOL_USE: YES
HOW MANY TIMES IN THE PAST YEAR HAVE YOU HAD 5 OR MORE DRINKS IN A DAY: 0
TOTAL SCORE: 0
TOTAL SCORE: 0
CONSUMPTION TOTAL: NEGATIVE
HAVE PEOPLE ANNOYED YOU BY CRITICIZING YOUR DRINKING: NO
AVERAGE NUMBER OF DAYS PER WEEK YOU HAVE A DRINK CONTAINING ALCOHOL: 0

## 2017-06-08 ASSESSMENT — PAIN SCALES - GENERAL
PAINLEVEL_OUTOF10: 0

## 2017-06-08 ASSESSMENT — COGNITIVE AND FUNCTIONAL STATUS - GENERAL
DAILY ACTIVITIY SCORE: 24
SUGGESTED CMS G CODE MODIFIER DAILY ACTIVITY: CH
CLIMB 3 TO 5 STEPS WITH RAILING: A LITTLE
MOBILITY SCORE: 23
SUGGESTED CMS G CODE MODIFIER MOBILITY: CI

## 2017-06-08 ASSESSMENT — GAIT ASSESSMENTS
DISTANCE (FEET): 500
DEVIATION: INCREASED BASE OF SUPPORT
GAIT LEVEL OF ASSIST: SUPERVISED

## 2017-06-08 ASSESSMENT — ACTIVITIES OF DAILY LIVING (ADL): TOILETING: INDEPENDENT

## 2017-06-08 NOTE — DISCHARGE SUMMARY
CHIEF COMPLAINT ON ADMISSION  Chief Complaint   Patient presents with   • Possible Stroke       CODE STATUS  Full Code    HPI & HOSPITAL COURSE  Dhaval C H&P dictated by Dr. Wilson. This is a 72 y.o. male with past medical history of type 2 diabetes, hypothyroidism who presented with right-sided numbness. Patient's symptoms have been going on for greater than 10 hours and was not a candidate for TPA therapy. Patient had a CTA head and neck which did not show any significant stenosis. Patient had a bedside swallow evaluation by nursing staff and passed his swallow evaluation. Patient was evaluated by physical and occupational therapy and did not have any acute needs. Patient's MRI brain showed acute small area of infarct in left thalamus . Patient's echocardiogram showed ejection fraction of 60%, RVSP 30 mmHg. Patient was started on aspirin 81 mg daily and atorvastatin 80 mg. Patient has a history of GI bleed and is reluctant to take Plavix or Aggrenox, he is agreeable to take aspirin 81 mg daily.  Patient was also noted to have A1c 6.9%. He stated he would like to defer starting medications until he returns to [facility] and discuss it with his PCP. Patient recovered sooner than expected and is discharged home in stable condition.    Therefore, he is discharged in good and stable condition with close outpatient follow-up.    SPECIFIC OUTPATIENT FOLLOW-UP  PCP in 1-2 weeks  Neurology in [facility]    DISCHARGE PROBLEM LIST  Principal Problem (Resolved):    Stroke (cerebrum) (CMS-[facility]) POA: Yes  Active Problems:    * No active hospital problems. *      MEDICATIONS ON DISCHARGE   Den Mayes   Home Medication Instructions JOSH:[id number]    Printed on:06/08/17 1200   Medication Information                      aspirin (ASA) 81 MG Chew Tab chewable tablet  Take 1 Tab by mouth every day.             atorvastatin (LIPITOR) 80 MG tablet  Take 1 Tab by mouth every evening.             clonazepam  (KLONOPIN) 1 MG Tab  Take 0.5 mg by mouth 2 times a day.             insulin glargine (LANTUS) 100 UNIT/ML Solution  Inject 24 Units as instructed 2 times a day.             thyroid (ARMOUR THYROID) 90 MG Tab  Take 90 mg by mouth every day.                 DIET  Orders Placed This Encounter   Procedures   • DIET ORDER     Standing Status: Standing      Number of Occurrences: 1      Standing Expiration Date:      Order Specific Question:  Diet:     Answer:  2 Gram Sodium [7]     Order Specific Question:  Diet:     Answer:  Regular [1]     Order Specific Question:  Macronutrient modifications:     Answer:  Low Cholesterol [7]       ACTIVITY  As tolerated.        CONSULTATIONS  None    PROCEDURES  None    LABORATORY  Lab Results   Component Value Date/Time    SODIUM 136 06/08/2017 05:27 AM    POTASSIUM 3.9 06/08/2017 05:27 AM    CHLORIDE 106 06/08/2017 05:27 AM    CO2 24 06/08/2017 05:27 AM    GLUCOSE 102* 06/08/2017 05:27 AM    BUN 15 06/08/2017 05:27 AM    CREATININE 0.73 06/08/2017 05:27 AM        Lab Results   Component Value Date/Time    WBC 8.9 06/08/2017 05:27 AM    HEMOGLOBIN 14.1 06/08/2017 05:27 AM    HEMATOCRIT 43.6 06/08/2017 05:27 AM    PLATELET COUNT 207 06/08/2017 05:27 AM        Total time of the discharge process exceeds 38 minutes

## 2017-06-08 NOTE — ED NOTES
BIB EMS. Pt began having weakness and tingling on R side last night. Pt went to Urgent Care because symptoms didn't improve overnight.  sent pt here by ambulance.  In ambulance BP went from 225/160 to 129/74. No has no CP. Pt  are equal, no facial droop.

## 2017-06-08 NOTE — THERAPY
"Occupational Therapy Evaluation completed.   Functional Status:  Supervision supine to sit, sit to stand.  Pt walked unit without AD.  Pt stood at toilet for toileting and washed hands standing at sink supervised.  Pt reports tingling in R hand/forearm but otherwise feels at baseline and has no concerns with self-care at this time.  Plan of Care: Patient with no further skilled OT needs in the acute care setting at this time  Discharge Recommendations:  Equipment: No Equipment Needed. Post-acute therapy Currently anticipate no further skilled therapy needs once patient is discharged from the inpatient setting.    See \"Rehab Therapy-Acute\" Patient Summary Report for complete documentation.    "

## 2017-06-08 NOTE — THERAPY
"Physical Therapy Evaluation completed.   Bed Mobility:  Supine to Sit: Supervised  Transfers: Sit to Stand: Supervised  Gait: Level Of Assist: Supervised with No Equipment Needed       Plan of Care: Will benefit from Physical Therapy 2 times per week; probable 1 more session once diagnosed.   Discharge Recommendations: Equipment: No Equipment Needed.   Pt presents with impaired UE sensation, tongue control and facial numbness associated with current medical status. Pt is functionally moving at or near baseline but symptoms have not resolved. Reports his tongue is 'heavy' but taste remains in tact, distal UE numbness remains in right. Denies any LE or trunk invovlment. Pt [place] driving but denies anything out of the ordinary. will follow for one more visit to ensure proper activity ducation is performed depending on medical diagnosis. Anticipate can return to car/home when appropriate   See \"Rehab Therapy-Acute\" Patient Summary Report for complete documentation.     "

## 2017-06-08 NOTE — CARE PLAN
Problem: Communication  Goal: The ability to communicate needs accurately and effectively will improve  Outcome: MET Date Met:  06/08/17    Problem: Safety  Goal: Will remain free from injury  Outcome: MET Date Met:  06/08/17  Goal: Will remain free from falls  Outcome: MET Date Met:  06/08/17    Problem: Infection  Goal: Will remain free from infection  Outcome: MET Date Met:  06/08/17    Problem: Venous Thromboembolism (VTW)/Deep Vein Thrombosis (DVT) Prevention:  Goal: Patient will participate in Venous Thrombosis (VTE)/Deep Vein Thrombosis (DVT)Prevention Measures  Outcome: MET Date Met:  06/08/17    Problem: Bowel/Gastric:  Goal: Normal bowel function is maintained or improved  Outcome: MET Date Met:  06/08/17  Goal: Will not experience complications related to bowel motility  Outcome: MET Date Met:  06/08/17    Problem: Knowledge Deficit  Goal: Knowledge of disease process/condition, treatment plan, diagnostic tests, and medications will improve  Outcome: MET Date Met:  06/08/17  Goal: Knowledge of the prescribed therapeutic regimen will improve  Outcome: MET Date Met:  06/08/17    Problem: Discharge Barriers/Planning  Goal: Patient’s continuum of care needs will be met  Outcome: MET Date Met:  06/08/17    Problem: Respiratory:  Goal: Respiratory status will improve  Outcome: MET Date Met:  06/08/17

## 2017-06-08 NOTE — PROGRESS NOTES
2 RN skin check:    Skin intact. Minor scratch on R inner thigh from hiking and climbing over a fallen log. Slight scabs to R upper chest and shoulder. Per pt it was an itchy rash a few days ago, no itching at this time. Pt reports he has been camping recently, from out of town, unfamiliar with [place]. Possible small healing PO rash.

## 2017-06-08 NOTE — H&P
CHIEF COMPLAINT:  Right-sided numbness.    HISTORY OF PRESENT ILLNESS:  This is a 72-year-old male with a history of   diabetes type 2, hypothyroidism, insomnia, who presented to the emergency room   tonight because he was having right facial numbness and right upper extremity   numbness which started last night after dinner when he was in the hotel where   he stayed to visit some friends here.  He is originally from [place].  He felt   that his symptoms would go away.  However, his symptoms persisted throughout   the whole day.  The wife noticed that his speech is not normal and he has   difficulty walking, kind of like wobbly or unsteady gait today.  He also feels   like he is numb.  He also feels like his tongue is numb on the right side.    There was no headache, no dizziness.  Thus the patient is being admitted for   stroke evaluation.    PAST MEDICAL HISTORY:  Diabetes type 2, hypothyroidism, insomnia.    PAST SURGICAL HISTORY:  Cholecystectomy and appendectomy.    SOCIAL HISTORY:  Denies smoking and illicit drug use.  He drinks a glass of   wine every now and then, but not every day.    FAMILY HISTORY:  No cancer.    ALLERGIES:  NKDA.    HOME MEDICATIONS:  Lantus 24 units b.i.d. subQ, Klonopin 0.5 mg b.i.d.,   thyroid [place] 90 mg daily.    REVIEW OF SYSTEMS:  All other systems reviewed were all negative.    PHYSICAL EXAMINATION:  VITAL SIGNS:  Blood pressure is 158/63, pulse of 66, respiratory rate of 12,   temperature of 36.6, oxygen is 99% on room air.  GENERAL:  The patient is a pleasant joseph lying in bed comfortably, not in   distress.  HEENT:  Normocephalic, atraumatic.  Eyes, pupils are reactive to light,   anicteric sclerae.  Pinkish palpebral conjunctivae.  Oral mucosa, no oral   lesions noted, moist mucosa.  NECK:  No JVD, no lymphadenopathy, no thyromegaly.  CHEST AND LUNGS:  Equal expansion.  Clear to auscultation bilaterally.  No   crackles, no wheezing.  CARDIOVASCULAR SYSTEM:  Regular rate and  rhythm.  S1, S2 heard.  No murmurs   noted.  GASTROINTESTINAL:  Positive bowel sounds.  No tenderness.  No   hepatosplenomegaly.  EXTREMITIES:  Pulses palpable in both upper and lower extremities.  No edema   noted.  NEUROLOGIC:  Cranial nerves II-XII intact.  I did not notice any tongue   deviation or droop.  Alert and oriented x3.  Motor exam is 5/5 in both upper   and lower extremities.  Sensation is diminished on the right upper extremity   and right lower part of his face.  SKIN:  No cyanosis.  Capillary refill time normal.  No rash.    LABORATORY DATA:  WBC is 11.3, hemoglobin 14.7, hematocrit 45.8, platelet   count of 235.  Sodium 137, potassium 4.2, chloride 102, CO2 of 27, anion gap   of 8, glucose of 118, BUN 21, creatinine of 0.87.  Troponin I less than 0.01.    INR 0.95.  TSH of 4.430.  CT scan of the head with and without contrast shows   no intracranial aneurysm, focal stenosis or abrupt large vessel cutoff.  No   acute intracranial hemorrhage or territorial infarct.  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  Probable left posterior fossa arachnoid cyst.    CT of the neck with and without contrast shows segmental moderate narrowing   of the distal left internal carotid artery, likely secondary to dissection.    Mild atherosclerotic narrowing of the carotid bulbs and proximal internal   carotid arteries bilaterally.  No evidence for common or internal carotid   artery occlusion.  Vertebral arteries are unremarkable.  Noncalcified plaque   and mild associated narrowing in the petrous portion of the left internal   carotid artery.    EKG is sinus rhythm.    ASSESSMENT AND PLAN:  1.  Right-sided numbness.  Rule out acute stroke.  We will get an MRI of the   brain without contrast.  We will get an echocardiogram.  Most likely we need   neurologist in the morning, however, he also mentioned to me that he does not   like to take aspirin or Plavix because he had a GI bleeding several years  ago.    However, I mentioned to them that since this is several years ago, for some   reason there was no EGD or colonoscopy done.  Since I am strongly suspecting   that he has a stroke.  He agreed taking baby aspirin 81 mg daily.  I will also   start him on statins.  I will keep him n.p.o.  I will have physical therapy   and occupational therapy and speech therapist to evaluate his swallowing or   dysphagia.  We will check lipid panel.  2.  Diabetes type 2.  We will check hemoglobin A1c.  I will put him on   Accu-Cheks q. 6 hours and low sliding scale insulin.  3.  Hypothyroidism.  Continue levothyroxine.  4.  Deep venous thrombosis.  I will put him on sequential compression devices.    MEDICAL DECISION MAKING:  Most likely he will stay more than 2 midnights.       ____________________________________     MD JADYN Whitaker / VITALY    DD:  06/08/2017 03:41:33  DT:  06/08/2017 04:01:49    D#:  [id number]  Job#:  [id number]

## 2017-06-08 NOTE — ED NOTES
Pt requesting to eat, informed that the dr has ordered him to have nothing to eat or drink at this time, verbalized understanding

## 2017-06-08 NOTE — PROGRESS NOTES
"Subjective:      Den Mayes is a 72 y.o. male who presents with Arm Pain          Arm Pain   Associated symptoms include tingling. Pertinent negatives include no chest pain.   Onset s/sx last night at 10pm notes pins and needles feeling to all fingers of right hand, c/o numbness to right face, notes BP is very high, is travelling from [place], wife has noted ataxia and aphasia (slurred speech) today. He felt he could not drive today. He denies much PMH of HTN but his BP is 184/84, wife states he took 1.5 of her metoprolol 50mg today and still has BP that is elevated. He is also diabetic, insulin dependent, check of glucose - unable to complete prior to EMS transport.     Review of Systems   Constitutional: Negative for fever and chills.   Cardiovascular: Negative for chest pain and palpitations.   Gastrointestinal: Negative for nausea and vomiting.   Musculoskeletal: Positive for joint pain ( POS for right arm pain and tingling ).   Neurological: Positive for dizziness, tingling, sensory change, speech change and focal weakness. Negative for tremors, seizures and loss of consciousness.       PMH:  has no past medical history on file.  MEDS:   Current outpatient prescriptions:   •  insulin glargine (LANTUS) 100 UNIT/ML Solution, Inject  as instructed every evening., Disp: , Rfl:   •  metoprolol SR (TOPROL XL) 50 MG TABLET SR 24 HR, Take 50 mg by mouth every day., Disp: , Rfl:   •  clonazepam (KLONOPIN) 1 MG Tab, Take 0.5 mg by mouth 2 times a day., Disp: , Rfl:   •  thyroid (ARMOUR THYROID) 90 MG Tab, Take 90 mg by mouth every day., Disp: , Rfl:   ALLERGIES: No Known Allergies  SURGHX: No past surgical history on file.  SOCHX:    FH: Family history was reviewed, no pertinent findings to report    I have worn a mask for the entire encounter with this patient.      Objective:     /84 mmHg  Pulse 66  Temp(Src) 36.8 °C (98.2 °F)  Resp 16  Ht 1.651 m (5' 5\")  Wt 115.667 kg (255 lb)  BMI 42.43 kg/m2  SpO2 " 96%     Physical Exam   Constitutional: He is oriented to person, place, and time. He appears well-developed and well-nourished. No distress.   HENT:   Head: Normocephalic and atraumatic.   Right Ear: External ear normal.   Left Ear: External ear normal.   Nose: Nose normal.   Eyes: Conjunctivae are normal. Right eye exhibits no discharge. Left eye exhibits no discharge. No scleral icterus.   Neck: Neck supple.   Pulmonary/Chest: Effort normal. No respiratory distress.   Musculoskeletal: Normal range of motion.   Normal interosseous strength bilat hands, normal  strength, +2rad bilat, normal sensation to light touch   Neurological: He is alert and oriented to person, place, and time. He is not disoriented. No cranial nerve deficit (2-12 grossly intact, notable droop to right lip corner and difficulty holding air in cheeks but able) or sensory deficit. Coordination and gait ( Ataxia w/ ambulation) abnormal.   Skin: Skin is warm and dry. He is not diaphoretic. No pallor.   Psychiatric: He has a normal mood and affect. His behavior is normal. Thought content normal. His speech is slurred.   Nursing note and vitals reviewed.       Aspirin 81mg x 2 was given PO (chew), pt tolerated well     Assessment/Plan:     1. Paresthesia  EMS is contacted to [facility]    2. Slurred speech      3. Ataxia      4. Right arm pain

## 2017-06-08 NOTE — ED NOTES
Pt resting in bed, resp are even and unlabored, no distress noted, co numbness/tingling feeling to face and lt arm, no weakness noted, vss, family at bedside, erp at bedside

## 2017-06-08 NOTE — PROGRESS NOTES
Pt on unit. On tele monitor, SR. AAO x 4, RA.     Pt objects to blood transfusion. Armband on pt, sticker on chart, documented in flowsheet.    Pt in bed with strip alarm in place. Pt ambulated to restroom, voided. NS running per MAR. IV's flushed, patent. MRI screening complete. [place] education booklet given and documented. IS performed, pt effective. Dysphagia screen performed, pt passed. PT NPO until lipid profile drawn. NIH scale of 1 for sensory numbness/tingling. Admit profile complete.    Pt first language is [place]yanese. Per pt and pt wife pt does not have slurred speech, pt has [place] accent.    Denies pain or needs at this time.  Will monitor, assist and medicate per MAR for duration of shift.  Hourly rounding implemented.

## 2017-06-08 NOTE — DIETARY
NUTRITION SERVICES: BMI - Pt with BMI >40 (=41.87). Weight loss counseling not appropriate in acute care setting. RECOMMEND - Referral to outpatient nutrition services for weight management after D/C.

## 2017-06-08 NOTE — ED PROVIDER NOTES
ED Provider Note    Scribed for Ryan Angel M.D. by Karl Lozada. 6/7/2017  7:05 PM    Primary care provider: No primary care provider on file.  Means of arrival: Ambulance  History obtained from: Patient  History limited by: None    CHIEF COMPLAINT  Chief Complaint   Patient presents with   • Possible Stroke     HPI  eDn Mayes is a 72 y.o. male who presents to the Emergency Department by ambulance transferred from urgent care for evaluation of possible stroke. Patient reports worsening right hand numbness and weakness which began last night at approximately 2200. He notes symptoms radiate up right arm to his jaw. Patient states symptoms have not improved since onset. Per spouse, she noticed patient having difficulty walking and slowed speech early today. Patient states he notes his tongue is numb on the right side. No associated chest pain or shortness of breath.Patient is also complaining of a bump to the left side of his head.  Patient denies ever being diagnosed with CVA. Denies blood thinner medication.    REVIEW OF SYSTEMS  Pertinent negatives include no chest pain or shortness of breath. As above, all other systems reviewed and are negative.   See HPI for further details.     PAST MEDICAL HISTORY  No pertinent past medical history noted.     SURGICAL HISTORY  patient denies any surgical history    SOCIAL HISTORY  Traveling from [place]     CURRENT MEDICATIONS  No current facility-administered medications on file prior to encounter.     Current Outpatient Prescriptions on File Prior to Encounter   Medication Sig Dispense Refill   • insulin glargine (LANTUS) 100 UNIT/ML Solution Inject  as instructed every evening.     • metoprolol SR (TOPROL XL) 50 MG TABLET SR 24 HR Take 50 mg by mouth every day.     • clonazepam (KLONOPIN) 1 MG Tab Take 0.5 mg by mouth 2 times a day.     • thyroid (ARMOUR THYROID) 90 MG Tab Take 90 mg by mouth every day.         ALLERGIES  No Known Allergies    PHYSICAL EXAM  VITAL  "SIGNS: /63 mmHg  Pulse 66  Temp(Src) 36.6 °C (97.9 °F)  Resp 12  Ht 1.702 m (5' 7.01\")  Wt 112.492 kg (248 lb)  BMI 38.83 kg/m2  SpO2 99%  Constitutional: Well developed, Well nourished, No acute distress, Non-toxic appearance.   HENT: Normocephalic, Atraumatic, Bilateral external ears normal, Oropharynx is clear mucous membranes are moist. No oral exudates or nasal discharge.   Eyes: Pupils are equal round and reactive, EOMI, Conjunctiva normal, No discharge.   Neck: Normal range of motion, No tenderness, Supple, No stridor. No meningismus.  Lymphatic: No lymphadenopathy noted.   Cardiovascular: Regular rate and rhythm without murmur rub or gallop.  Thorax & Lungs: Clear breath sounds bilaterally without wheezes, rhonchi or rales. There is no chest wall tenderness.   Abdomen: Soft non-tender non-distended. There is no rebound or guarding. No organomegaly is appreciated. Bowel sounds are normal.  Skin: Lesion to left temporal 1.5 cm x 1 cm fungating in appearance.  Back: No CVA or spinal tenderness.   Extremities: Intact distal pulses, No edema, No tenderness, No cyanosis, No clubbing. Capillary refill is less than 2 seconds.  Musculoskeletal: Good range of motion in all major joints. No tenderness to palpation or major deformities noted.   Neurologic: Alert & oriented x 3, Reflexes are normal. Numbness to right side of tongue, Equal facial sensation on right compared to left. No pronator drift. Diminished sensation to volar aspect on right, dorsal aspect normal.  Psychiatric: Affect normal, Judgment normal, Mood normal. There is no suicidal ideation or patient reported hallucinations.       DIAGNOSTIC STUDIES / PROCEDURES    LABS  Labs Reviewed   CBC WITH DIFFERENTIAL - Abnormal; Notable for the following:     WBC 11.3 (*)     MCHC 32.1 (*)     Monos (Absolute) 1.00 (*)     All other components within normal limits    Narrative:     Indicate which anticoagulants the patient is on:->NONE   COMP " METABOLIC PANEL - Abnormal; Notable for the following:     Glucose 118 (*)     All other components within normal limits    Narrative:     Indicate which anticoagulants the patient is on:->NONE   TROPONIN    Narrative:     Indicate which anticoagulants the patient is on:->NONE   PROTHROMBIN TIME    Narrative:     Indicate which anticoagulants the patient is on:->NONE   APTT    Narrative:     Indicate which anticoagulants the patient is on:->NONE   ESTIMATED GFR    Narrative:     Indicate which anticoagulants the patient is on:->NONE      All labs reviewed by me.    EKG Interpretation:  EKG Interpretation    Interpreted by me    Rhythm: normal sinus   Rate: normal  Axis: normal  Ectopy: none  Conduction: normal  ST Segments: no acute change  T Waves: no acute change  Q Waves: none    Clinical Impression: no acute changes and normal EKG     RADIOLOGY  CT-CTA NECK WITH & W/O-POST PROCESSING   Final Result      1.  Segmental moderate narrowing of distal LEFT internal carotid artery, likely secondary to dissection.   2.  Mild atherosclerotic narrowing of the carotid bulbs and proximal internal carotid arteries bilaterally.   3.  No evidence for common or internal carotid artery occlusion.   4.  Vertebral arteries are unremarkable.   5.  Noncalcified plaque and mild associated narrowing in the petrous portion of LEFT internal carotid artery.      CT-CTA HEAD WITH & W/O-POST PROCESS   Final Result      1.  No intracranial aneurysm, focal stenosis or abrupt large vessel cut off.   2.  No acute intracranial hemorrhage or territorial infarct.   3.  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   4.  Probable LEFT posterior fossa arachnoid cyst.        The radiologist's interpretation of all radiological studies have been reviewed by me.    COURSE & MEDICAL DECISION MAKING  Nursing notes, VS, PMSFHx reviewed in chart.    7:05 PM Patient seen and examined at bedside. Ordered for head CT, head CT-CTA, and lab worok to  evaluate. Patient was treated with 1,000 mL NS for his symptoms. Patient present's with stroke like symptoms with LOLITA stroke scale of 2.  I am concerned with the recent spike in blood pressure are causing these stroke symptoms and will order for CT scan. Based on the onset of symptoms, we are passed the window of thrombolytic treatment. He is not a candidate for alteplase therapy based on time of onset    8:29 PM I discussed patient's case and diagnostic studies noted above. He was informed patient is suffering a sensory stroke.   White blood cell count is elevated at 11,300. There is no shift or anemia. Serum watch her lites are unremarkable. INR is normal. Troponin is unremarkable at 0.01.    The patient's CT angiogram of the head shows no evidence of intracranial aneurysm or focal stenosis and no evidence of large vessel cutoff sign. There is a hypoplastic A1 segment of the left anterior cerebral artery. More concerning, there is a segmental moderate narrowing of the left internal carotid artery distally which is reported as likely secondary to dissection.    I spoke with Dr. Shant Barkley following my discussion with the radiologist. Dr. Barkley does not feel this is in fact a dissection flap but rather a vessel narrowing and recommends aspirin and Plavix. I administered per rectum aspirin but the patient has had some bleeding in the past with Plavix therapy and will hold on this for now.    I explained to the patient that he will need further diagnostic studies and at least an echo and MRI to understand optimal medical management. Patient had many questions which I answered as he is quite concerned about his plan of care. He certainly has a high risk for a large vessel stroke    Please note critical care time of 30 minutes was spent in the care of this patient outside of procedure time    I discussed patient being admitted to the hospital for further observation.      DISPOSITION:  Patient will be admitted to   [facility], Hospitalist, in stable condition.      FINAL IMPRESSION  1. Cerebrovascular accident (CVA), unspecified mechanism (CMS-HCC)    2. Stenosis of left carotid artery      critical care time, 30 minutes     Karl GUTIERREZ (Scribe), am scribing for, and in the presence of, Ryan Angel M.D..    Electronically signed by: Karl Lozada (Scribe), 6/7/2017    IRyan M.D. personally performed the services described in this documentation, as scribed by Karl Lozada in my presence, and it is both accurate and complete.    The note accurately reflects work and decisions made by me.  Ryan Angel  6/7/2017  10:04 PM

## 2017-06-08 NOTE — DISCHARGE INSTRUCTIONS
Discharge Instructions    Discharged to home by car with relative. Discharged via wheelchair, hospital escort: Yes.  Special equipment needed: Not Applicable    Be sure to schedule a follow-up appointment with your primary care doctor or any specialists as instructed.     Discharge Plan:   Influenza Vaccine Indication: Indicated: Not available from distributor/    I understand that a diet low in cholesterol, fat, and sodium is recommended for good health. Unless I have been given specific instructions below for another diet, I accept this instruction as my diet prescription.   Other diet: Cardiac    Special Instructions: None    · Is patient discharged on Warfarin / Coumadin?   No     · Is patient Post Blood Transfusion?  No    Depression / Suicide Risk    As you are discharged from this [facility] facility, it is important to learn how to keep safe from harming yourself.    Recognize the warning signs:  · Abrupt changes in personality, positive or negative- including increase in energy   · Giving away possessions  · Change in eating patterns- significant weight changes-  positive or negative  · Change in sleeping patterns- unable to sleep or sleeping all the time   · Unwillingness or inability to communicate  · Depression  · Unusual sadness, discouragement and loneliness  · Talk of wanting to die  · Neglect of personal appearance   · Rebelliousness- reckless behavior  · Withdrawal from people/activities they love  · Confusion- inability to concentrate     If you or a loved one observes any of these behaviors or has concerns about self-harm, here's what you can do:  · Talk about it- your feelings and reasons for harming yourself  · Remove any means that you might use to hurt yourself (examples: pills, rope, extension cords, firearm)  · Get professional help from the community (Mental Health, Substance Abuse, psychological counseling)  · Do not be alone:Call your Safe Contact- someone whom you trust  who will be there for you.  · Call your local CRISIS HOTLINE 591-3139 or 458-346-7842  · Call your local Children's Mobile Crisis Response Team [place] (696) 483-7792 or www.[employer]  · Call the toll free National Suicide Prevention Hotlines   · National Suicide Prevention Lifeline 354-832-IPQA (7329)  · National [place] Line Network 800-SUICIDE (796-2046)    Stroke Prevention  Some health problems and behaviors may make it more likely for you to have a stroke. Below are ways to lessen your risk of having a stroke.   · Be active for at least 30 minutes on most or all days.  · Do not smoke. Try not to be around others who smoke.  · Do not drink too much alcohol.  ¨ Do not have more than 2 drinks a day if you are a man.  ¨ Do not have more than 1 drink a day if you are a woman and are not pregnant.  · Eat healthy foods, such as fruits and vegetables. If you were put on a specific diet, follow the diet as told.  · Keep your cholesterol levels under control through diet and medicines. Look for foods that are low in saturated fat, trans fat, cholesterol, and are high in fiber.  · If you have diabetes, follow all diet plans and take your medicine as told.  · Ask your doctor if you need treatment to lower your blood pressure. If you have high blood pressure (hypertension), follow all diet plans and take your medicine as told by your doctor.  · If you are 18-39 years old, have your blood pressure checked every 3-5 years. If you are age 40 or older, have your blood pressure checked every year.  · Keep a healthy weight. Eat foods that are low in calories, salt, saturated fat, trans fat, and cholesterol.  · Do not take drugs.  · Avoid birth control pills, if this applies. Talk to your doctor about the risks of taking birth control pills.  · Talk to your doctor if you have sleep problems (sleep apnea).  · Take all medicine as told by your doctor.  ¨ You may be told to take aspirin or blood thinner medicine. Take this  "medicine as told by your doctor.  ¨ Understand your medicine instructions.  · Make sure any other conditions you have are being taken care of.  GET HELP RIGHT AWAY IF:  · You suddenly lose feeling (you feel numb) or have weakness in your face, arm, or leg.  · Your face or eyelid hangs down to one side.  · You suddenly feel confused.  · You have trouble talking (aphasia) or understanding what people are saying.  · You suddenly have trouble seeing in one or both eyes.  · You suddenly have trouble walking.  · You are dizzy.  · You lose your balance or your movements are clumsy (uncoordinated).  · You suddenly have a very bad headache and you do not know the cause.  · You have new chest pain.  · Your heart feels like it is fluttering or skipping a beat (irregular heartbeat).  Do not wait to see if the symptoms above go away. Get help right away. Call your local emergency services (911 in [place] Do not drive yourself to the hospital.     This information is not intended to replace advice given to you by your health care provider. Make sure you discuss any questions you have with your health care provider.     Document Released: 06/18/2013 Document Revised: 01/08/2016 Document Reviewed: 06/20/2014  [employer] Patient Education ©2016 [employer]  STROKE POCKET CARD    Stroke Recommendations    Action Details      Neuro checks  Per physician order set or \"Assessment Frequency Guidelines\".     DVT/VTE prophylaxis by 2nd day Stroke pts are at increased risk of developing a DVT. Consider both pharmacological (Lovenox & Heparin SQ) and mechanical (SCD's).   Anticoagulation therapy for Pts with A-fib/flutter Atrial fibrillation/flutter is the most common form of cardiac arrhythmia. Blood pools in the atria of the heart, which can result in the formation of clots. Anticoagulation therapy can prevent initial stroke and prevent recurrent ischemic stroke. Pt's with a-fib/flutter should be discharged on anticoagulation " unless contraindicated.    Antithrombotic medication by end of 2nd day ASA, Plavix, Aggrenox, Coumadin. Antithrombotic medication should be given by the end of the second day unless contraindicated. If NPO, consider alternate routes.   Discharge on Statin Medication  Obtain a lipid profile within 48 hours of admission. Patients with high cholesterol (LDL >100), without a current lipid panel, or who were on lipid lowering meds prior to admit, may need a discharge Rx for a statin medication. If contraindicated, MD must document reason.      Dysphagia screen  Aspiration is a concern for pts with neurological deficits. Swallow screen or swallowing eval by Speech Therapy to assess for dysphagia must be performed prior to any oral intake, including meds.    Discharged on antithrombotic Reduce stroke mortality and morbidity; prevent recurrence.  Consider ASA, Plavix, Coumadin or Aggrenox.        Action  Details   Smoking cessation Cigarettes and other tobacco products can cause damage to blood vessels and increase the risk of stroke. (Provide Smoking Cessation counseling. Emphasize quitting if smoked in last 12 months).   Rehab assessment All patients suspected of having a stroke should be assessed for rehabilitation needs. This includes PT, OT, SLP referrals/interventions. If the symptoms resolve, MD needs to document that no rehab was needed.   Stroke Education should be given to patient or caregivers use-Stroke Education Guide. Document in EPIC using the Stroke/CVA/TIA/  Hemorrahagic Ischemia education template. In addition, use the discharge navigator with the appropriate stroke diagnosis. 1 Warning signs & symptoms of stroke: Sudden numbness or weakness of face, arm, leg; sudden confusion, trouble speaking or understanding; sudden trouble seeing in one or both eyes; sudden trouble walking, dizziness, loss of balance or coordination; sudden severe headache with no known cause.    2 Activation of emergency medical  "system.    3 Patient medications prescribed at discharge. The discharge summary must match the patients' written discharge instructions.    4 Patient risk factors for stroke: HTN, smoking, high cholesterol, diabetes, obesity, poor nutrition, atrial fibrillation, carotid stenosis, illicit drug use, etc.    5 Follow up with physician post discharge.        Remember the acronym \"F.A.S.T.\"   F=Face (Ask person to smile-Does one side of the face droop?)  A=Arm (Ask person to raise both arms-Does one arm drift down?)   S=Speech (Ask person to repeat a phrase-Is their speech slurred?)  T=Time (If you observe any of these signs, get help Immediately!)           "

## 2017-06-08 NOTE — ED NOTES
Pt back to room from ct scan, no change in condition, resp are even and unlabored, neuros remain unchanged, family at bedside

## 2017-06-08 NOTE — ED NOTES
Pt family at bedside, resp are even and unlabored, aox4, denies any needs or pain, waiting on room for admit

## 2017-06-09 NOTE — PROGRESS NOTES
Patient seen by PT/OT this a.m. State he is stable on his feet. MRI and ECHO completed this afternoon. Dr. Miles reviewed results and placed discharge orders for the patient. Instructions and education reviewed with patient and his wife. Given scripts. IV's removed. Fluids stopped. Advised to follow up with primary care as soon as they get back home. All concerns and questions addressed.

## 2017-06-09 NOTE — PROGRESS NOTES
Monitor summary: SR 68-72, UT 0.17, QRS 0.10, QT 0.37, with rare PVCs per strip from monitor room.

## 2017-06-30 LAB — EKG IMPRESSION: NORMAL
